# Patient Record
Sex: MALE | Race: BLACK OR AFRICAN AMERICAN | NOT HISPANIC OR LATINO | Employment: STUDENT | URBAN - METROPOLITAN AREA
[De-identification: names, ages, dates, MRNs, and addresses within clinical notes are randomized per-mention and may not be internally consistent; named-entity substitution may affect disease eponyms.]

---

## 2017-02-08 ENCOUNTER — TELEPHONE (OUTPATIENT)
Dept: FAMILY MEDICINE | Facility: CLINIC | Age: 27
End: 2017-02-08

## 2017-02-08 NOTE — TELEPHONE ENCOUNTER
Patient had gallbladder removed in 2014 and goes to the bathroom a lot and his job needs something stating he had it removed and this is one of the side effects.  Please advise.

## 2017-02-08 NOTE — TELEPHONE ENCOUNTER
----- Message from Elizabeth Posada sent at 2/8/2017 11:29 AM CST -----  Contact: Self  Pt is calling requesting Staff contact him regarding a doctor's statement for his job.    He can be reached at [phone].    Thank you.